# Patient Record
Sex: FEMALE
[De-identification: names, ages, dates, MRNs, and addresses within clinical notes are randomized per-mention and may not be internally consistent; named-entity substitution may affect disease eponyms.]

---

## 2019-09-19 ENCOUNTER — HOSPITAL ENCOUNTER (INPATIENT)
Dept: HOSPITAL 88 - FSED | Age: 59
LOS: 2 days | Discharge: HOME | DRG: 313 | End: 2019-09-21
Attending: INTERNAL MEDICINE | Admitting: INTERNAL MEDICINE
Payer: COMMERCIAL

## 2019-09-19 VITALS — HEIGHT: 63 IN | WEIGHT: 293 LBS | BODY MASS INDEX: 51.91 KG/M2

## 2019-09-19 DIAGNOSIS — J45.909: ICD-10-CM

## 2019-09-19 DIAGNOSIS — I10: ICD-10-CM

## 2019-09-19 DIAGNOSIS — E66.01: ICD-10-CM

## 2019-09-19 DIAGNOSIS — R07.89: Primary | ICD-10-CM

## 2019-09-19 DIAGNOSIS — K21.9: ICD-10-CM

## 2019-09-19 PROCEDURE — 71046 X-RAY EXAM CHEST 2 VIEWS: CPT

## 2019-09-19 PROCEDURE — 93017 CV STRESS TEST TRACING ONLY: CPT

## 2019-09-19 PROCEDURE — 82553 CREATINE MB FRACTION: CPT

## 2019-09-19 PROCEDURE — 80053 COMPREHEN METABOLIC PANEL: CPT

## 2019-09-19 PROCEDURE — 82550 ASSAY OF CK (CPK): CPT

## 2019-09-19 PROCEDURE — 78452 HT MUSCLE IMAGE SPECT MULT: CPT

## 2019-09-19 PROCEDURE — 93306 TTE W/DOPPLER COMPLETE: CPT

## 2019-09-19 PROCEDURE — 80061 LIPID PANEL: CPT

## 2019-09-19 PROCEDURE — 36415 COLL VENOUS BLD VENIPUNCTURE: CPT

## 2019-09-19 PROCEDURE — 99284 EMERGENCY DEPT VISIT MOD MDM: CPT

## 2019-09-19 PROCEDURE — 93005 ELECTROCARDIOGRAM TRACING: CPT

## 2019-09-19 PROCEDURE — 84484 ASSAY OF TROPONIN QUANT: CPT

## 2019-09-19 PROCEDURE — 85025 COMPLETE CBC W/AUTO DIFF WBC: CPT

## 2019-09-19 NOTE — DIAGNOSTIC IMAGING REPORT
EXAMINATION: PA and lateral views of the chest.



COMPARISON: None



CLINICAL HISTORY:  Chest pain since yesterday

     

DISCUSSION:



Lines/tubes:  None.



Lungs:  The lungs are well inflated and clear. There is no evidence of

pneumonia or pulmonary edema.



Pleura:  There is no pleural effusion or pneumothorax.



Heart and mediastinum:  Cardiomediastinal silhouette is unremarkable.   

Pulmonary vasculature is normal.



Bones and soft tissues:  No acute bony abnormalities.  Age appropriate

degenerative changes in the thoracic spine



IMPRESSION: 

No acute cardiopulmonary abnormalities.











Signed by: Dr. Agustín Baugh M.D. on 9/19/2019 9:53 PM

## 2019-09-20 VITALS — DIASTOLIC BLOOD PRESSURE: 60 MMHG | SYSTOLIC BLOOD PRESSURE: 124 MMHG

## 2019-09-20 VITALS — DIASTOLIC BLOOD PRESSURE: 58 MMHG | SYSTOLIC BLOOD PRESSURE: 122 MMHG

## 2019-09-20 VITALS — SYSTOLIC BLOOD PRESSURE: 94 MMHG | DIASTOLIC BLOOD PRESSURE: 51 MMHG

## 2019-09-20 VITALS — SYSTOLIC BLOOD PRESSURE: 117 MMHG | DIASTOLIC BLOOD PRESSURE: 17 MMHG

## 2019-09-20 VITALS — DIASTOLIC BLOOD PRESSURE: 51 MMHG | SYSTOLIC BLOOD PRESSURE: 105 MMHG

## 2019-09-20 VITALS — SYSTOLIC BLOOD PRESSURE: 122 MMHG | DIASTOLIC BLOOD PRESSURE: 58 MMHG

## 2019-09-20 LAB
CK MB SERPL-MCNC: 1.9 NG/ML (ref 0–5)
CK MB SERPL-MCNC: 1.9 NG/ML (ref 0–5)
CK SERPL-CCNC: 86 IU/L (ref 29–168)

## 2019-09-20 RX ADMIN — NITROGLYCERIN SCH GM: 20 OINTMENT TOPICAL at 12:12

## 2019-09-20 RX ADMIN — TOPIRAMATE SCH MG: 25 TABLET, COATED ORAL at 16:00

## 2019-09-20 RX ADMIN — FAMOTIDINE SCH MG: 20 TABLET, FILM COATED ORAL at 08:46

## 2019-09-20 RX ADMIN — Medication SCH MG: at 08:46

## 2019-09-20 RX ADMIN — FAMOTIDINE SCH MG: 20 TABLET, FILM COATED ORAL at 21:35

## 2019-09-20 RX ADMIN — NITROGLYCERIN SCH GM: 20 OINTMENT TOPICAL at 17:52

## 2019-09-20 RX ADMIN — PANTOPRAZOLE SODIUM SCH MG: 40 TABLET, DELAYED RELEASE ORAL at 08:46

## 2019-09-20 RX ADMIN — TOPIRAMATE SCH MG: 25 TABLET, COATED ORAL at 08:46

## 2019-09-20 RX ADMIN — NITROGLYCERIN SCH GM: 20 OINTMENT TOPICAL at 06:00

## 2019-09-20 NOTE — XMS REPORT
[facility]

                             Created on: 2019



Elena Turner

External Reference #: [id number]

: 1960

Sex: Female



Demographics







                          Address                   [address] [place]  [address]

 

                          Home Phone                [phone]

 

                          Preferred Language        Unknown

 

                          Marital Status            Unknown

 

                          Latter day Affiliation     Unknown

 

                          Race                      Unknown

 

                          Ethnic Group              UNK





Author







                          Author                    Tati Garces

 

                          Organization              [employer]

 

                          Address                   Unknown

 

                          Phone                     Unavailable







Care Team Providers







                    Care Team Member Name    Role                Phone

 

                    Tati Garces      CP                  Unavailable



                                                                



Allergies

          No Known Allergies                                                    
                                   



Problems

          





             Problem Type    Condition    Code         Onset Dates    Condition Status

 

             Problem      Other chronic pain    G89.29                    Active

 

             Problem      Pain in right knee    M25.561                   Active

 

             Problem      Pain in left knee    M25.562                   Active

 

             Problem      BMI 50.0-59.9, adult    Z68.43                    Active

 

             Problem      Essential hypertension    I10                       Active

 

             Problem      Cardiomegaly    I51.7                     Active

 

             Problem      Metabolic syndrome    E88.81                    Active

 

             Problem      Gastroesophageal reflux disease without esophagitis    K21.9                     Active

 

                    Problem             Migraine with aura and without status migrainosus, not intractable    G43.109

                                                    Active

 

             Problem      BRODY (obstructive sleep apnea)    G47.33                    Active

 

             Problem      NAFLD (nonalcoholic fatty liver disease)    K76.0                     Active



                                                                                
                                                                                
                          



Medications

          





        Medication    Code System    Code    Instructions    Start Date    End Date    Status    Dosage



 

        Pantoprazole Sodium    NDC     [address]-24    40 mg Orally Once a day                    Active    

1 tablet



                                                                              



Results

          No Known Results                                                      
             



Summary Purpose

          [employer] Submission

## 2019-09-20 NOTE — XMS REPORT
[facility]patient [facility]

                             Created on: 2018



TurnerElena caal

External Reference #: [id number]

: 1960

Sex: Female



Demographics







                          Address                   [address] [place]  [address]

 

                          Home Phone                [phone]

 

                          Preferred Language        Unknown

 

                          Marital Status            Unknown

 

                          Yazidism Affiliation     Unknown

 

                          Race                      Unknown

 

                          Ethnic Group              /Latin





Author







                          Author                    Tati Garces

 

                          Organization              [employer]

 

                          Address                   Unknown

 

                          Phone                     Unavailable







Care Team Providers







                    Care Team Member Name    Role                Phone

 

                    Tati Garces      CP                  Unavailable



                                                                



Allergies

          No Known Allergies                                                    
                                   



Problems

          





             Problem Type    Condition    Code         Onset Dates    Condition Status

 

             Assessment    Gastroesophageal reflux disease without esophagitis    K21.9                     Active



 

             Problem      BMI 50.0-59.9, adult    Z68.43                    Active

 

             Problem      Other chronic pain    G89.29                    Active

 

             Problem      BRODY (obstructive sleep apnea)    G47.33                    Active

 

             Problem      NAFLD (nonalcoholic fatty liver disease)    K76.0                     Active

 

             Problem      Cardiomegaly    I51.7                     Active

 

             Problem      Pain in right knee    M25.561                   Active

 

             Problem      Pain in left knee    M25.562                   Active

 

             Problem      Gastroesophageal reflux disease without esophagitis    K21.9                     Active

 

                    Problem             Migraine with aura and without status migrainosus, not intractable    G43.109

                                                    Active



                                                                                
                                                                                
                



Medications

          





        Medication    Code System    Code    Instructions    Start Date    End Date    Status    Dosage



 

           Pantoprazole Sodium    NDC        69644641946    40 mg Orally Once a day    2017      

                          Active                    1 tablet



                                                                              



Results

          No Known Results                                                      
             



Summary Purpose

          [employer] Submission

## 2019-09-20 NOTE — XMS REPORT
Inpatient Providers [facility]

                             Created on: 2019



TurnerElena caal

External Reference #: [id number]

: 1960

Sex: Female



Demographics







                          Address                   [address] [place]  [address]

 

                          Home Phone                [phone]

 

                          Preferred Language        Unknown

 

                          Marital Status            Unknown

 

                          Mandaeism Affiliation     Unknown

 

                          Race                      Unknown

 

                          Ethnic Group              UNK





Author







                          Author                    Tati Garces

 

                          Organization              [employer]

 

                          Address                   Unknown

 

                          Phone                     Unavailable







Care Team Providers







                    Care Team Member Name    Role                Phone

 

                    Tati Garces      CP                  Unavailable



                                                                



Allergies

          No Known Allergies                                                    
                                   



Problems

          





             Problem Type    Condition    Code         Onset Dates    Condition Status

 

             Problem      Other chronic pain    G89.29                    Active

 

             Problem      Pain in right knee    M25.561                   Active

 

             Problem      Pain in left knee    M25.562                   Active

 

             Assessment    Left-sided chest pain    R07.9                     Active

 

             Problem      BMI 50.0-59.9, adult    Z68.43                    Active

 

             Problem      Essential hypertension    I10                       Active

 

             Problem      Cardiomegaly    I51.7                     Active

 

             Problem      Metabolic syndrome    E88.81                    Active

 

             Problem      Gastroesophageal reflux disease without esophagitis    K21.9                     Active

 

                    Problem             Migraine with aura and without status migrainosus, not intractable    G43.109

                                                    Active

 

             Problem      BRODY (obstructive sleep apnea)    G47.33                    Active

 

             Problem      NAFLD (nonalcoholic fatty liver disease)    K76.0                     Active



                                                                                
                                                                                
                                    



Medications

          No Known Medications                                                  
                           



Results

          No Known Results                                                      
             



Summary Purpose

          [employer] Submission

## 2019-09-20 NOTE — XMS REPORT
Patient Summary Document

                             Created on: 2019



TAYLOR HENDERSON

External Reference #: [id number]

: 1960

Sex: Female



Demographics







                          Address                   [address] [place] [address]  [address]

 

                          Home Phone                [phone]

 

                          Preferred Language        Unknown

 

                          Marital Status            Unknown

 

                          Rastafarian Affiliation     Unknown

 

                          Race                      Unknown

 

                                        Additional Race(s)  

 

                          Ethnic Group              Unknown





Author







                          Author                    [facility]

 

                          [facility]

 

                          Address                   Unknown

 

                          Phone                     Unavailable







Care Team Providers







                    Care Team Member Name    Role                Phone

 

                    SUSANA TERESA       Unavailable         Unavailable







Problems

This patient has no known problems.



Allergies, Adverse Reactions, Alerts

This patient has no known allergies or adverse reactions.



Medications

This patient has no known medications.



Results







           Test Description    Test Time    Test Comments    Text Results    Atomic Results    Result

 Comments

 

                [facility]    2019 21:52:00                                                         

                                                 [place] [address]      Patient Name: TAYLOR HENDERSON                                  
MR #: [id number]                     : 1960                            
      Age/Sex: 59/F  Acct #: [id number]                              Req #: 
[id number]  Adm Physician:                                                      
Ordered by: KUNAL TERESA MD                            Report #: [id number]     
  Location: [facility]                                    Room/Bed:                   
 
___________________________________________________________________________________________________
   Procedure: [id number] HOPD/CXR 2 VIEW - [facility]  Exam Date: 19             
              Exam Time:                                               
REPORT STATUS: Signed       EXAMINATION: PA and lateral views of the chest.     
COMPARISON: None      CLINICAL HISTORY:  Chest pain since yesterday           
DISCUSSION:      Lines/tubes:  None.      Lungs:  The lungs are well inflated 
and clear. There is no evidence of   pneumonia or pulmonary edema.      Pleura: 
There is no pleural effusion or pneumothorax.      Heart and mediastinum:  
Cardiomediastinal silhouette is unremarkable.      Pulmonary vasculature is 
normal.      Bones and soft tissues:  No acute bony abnormalities.  Age 
appropriate   degenerative changes in the thoracic spine      IMPRESSION:    No 
acute cardiopulmonary abnormalities.                  Signed by: Dr. Waqas Baugh M.D. on 2019 9:53 PM        Dictated By: WAQAS BAUGH MD  
Electronically Signed By: WAQAS BAUGH MD on 19  Transcribed By: 
BEV on 19       COPY TO:   KUNAL TERESA MD              

 

                SCR MAMM BILATERAL [facility] CAD DIGITAL    2019 13:44:25                     - SCR MAMM BILATERAL

 [facility] CAD DIGITALBILATERAL DIGITAL SCREENING MAMMOGRAM 3D/2D WITH CAD: 
2019CLINICAL: Asymptomatic.  Digital breast tomosynthesis was performed in 
addition to routine CC and MLO views.  Current mammographic images were 
evaluated by either a [employer] M-Vu or a [employer] ImageChecker CAD (computer aided 
detection system).  Comparison is made to exam dated  2017 mammogram - [facility].  There are scattered fibroglandular tissues in both 
breasts.  There are benign calcifications in both breasts.  No suspicious mass, 
architectural distortion, malignant type calcification, or lymph node 
abnormality detected.  Breast architecture is stable compared to prior 
exams.IMPRESSION: BENIGNThere is no mammographic evidence of malignancy. Resume 
annual screening mammography in one year.  Eric Mane M.D.          
ss/penrad:2019 13:44:25  Imaging Technologist: Syl Louie [facility], [facility] sent: BIRADS 1-2 Normal  Mammogram BI-RADS: 2 
Benign

## 2019-09-20 NOTE — XMS REPORT
Inpatient [facility]

                             Created on: 2019



Elena Turner

External Reference #: [id number]

: 1960

Sex: Female



Demographics







                          Address                   [address] [place]  [address]

 

                          Home Phone                [phone]

 

                          Preferred Language        Unknown

 

                          Marital Status            Unknown

 

                          Christianity Affiliation     Unknown

 

                          Race                      Unknown

 

                          Ethnic Group              UNK





Author







                          Author                    Tati Garces

 

                          Organization              [employer]

 

                          Address                   Unknown

 

                          Phone                     Unavailable







Care Team Providers







                    Care Team Member Name    Role                Phone

 

                    Tati Garces                        Unavailable



                                                                



Allergies, Adverse Reactions, Alerts

          





                    Substance           Reaction            Event Type

 

                    N.K.D.A.            Info Not Available    Non Drug Allergy



                                                                                
       



Problems

          





             Problem Type    Condition    Code         Onset Dates    Condition Status

 

             Problem      Other chronic pain    G89.29                    Active

 

             Problem      Pain in right knee    M25.561                   Active

 

             Problem      Pain in left knee    M25.562                   Active

 

             Problem      Essential hypertension    I10                       Active

 

             Problem      Cardiomegaly    I51.7                     Active

 

             Problem      Metabolic syndrome    E88.81                    Active

 

             Problem      Gastroesophageal reflux disease without esophagitis    K21.9                     Active

 

                    Problem             Migraine with aura and without status migrainosus, not intractable    G43.109

                                                    Active

 

             Problem      BRODY (obstructive sleep apnea)    G47.33                    Active

 

             Problem      NAFLD (nonalcoholic fatty liver disease)    K76.0                     Active

 

                    Assessment          Migraine with aura and without status migrainosus, not intractable    

G43.109                                             Active

 

             Assessment    Essential hypertension    I10                       Active

 

             Assessment    Left-sided chest pain    R07.9                     Active

 

             Assessment    BRODY (obstructive sleep apnea)    G47.33                    Active

 

             Assessment    Metabolic syndrome    E88.81                    Active

 

             Problem      BMI 50.0-59.9, adult    Z68.43                    Active



                                                                                
                                                                                
                                                                            



Medications

          





        Medication    Code System    Code    Instructions    Start Date    End Date    Status    Dosage



 

        Topamax    NDC     83023634504    100 MG Orally bid    2019            Active    bid

 

          Telmisartan    NDC       53431019914    20 mg Orally Once a day    2019              Active

                                        1 tablet

 

             Olmesartan Medoxomil    NDC          12098294976    20 MG Orally Once a day    2019 

                                        Inactive            1 tablet

 

          Telmisartan    NDC       76500953294    20 MG Orally Once a day    2019              Active

                                        1 tablets

 

           ProAir HFA    NDC        30829874573    108 (90 Base) MCG/ACT Inhalation every 6 hrs                

                          Active                    2 puffs as needed

 

           Phentermine HCl    NDC        64261738108    37.5 MG Orally Once a day                     Active                    1 tablet

 

        Topamax    NDC     83693633403    25 MG Orally daily    Aug 31, 2018            Inactive    1 tablet



 

        Pantoprazole Sodium    NDC     [address]-19    40 mg Orally Once a day                    Active    

1 tablet



                                                                                
                                                                                
      



Vital Signs

          





                          Date/Time:                2019

 

                          BMI                       58.27 Index

 

                          Weight                    329 lbs

 

                          Height                    63" in

 

                          Temperature               97.8 F

 

                          Blood Pressure Diastolic    70 mm Hg

 

                          Blood Pressure Systolic    126 mm Hg



                                                                              



Results

          No Known Results                                                      
             



Summary Purpose

          eClinicalWorks Submission

## 2019-09-20 NOTE — XMS REPORT
[facility]

                             Created on: 10/05/2018



Elena Turner

External Reference #: [id number]

: 1960

Sex: Female



Demographics







                          Address                   [address] [place]  [address]

 

                          Home Phone                [phone]

 

                          Preferred Language        Unknown

 

                          Marital Status            Unknown

 

                          Latter-day Affiliation     Unknown

 

                          Race                      Unknown

 

                          Ethnic Group              /Latin





Author







                          Author                    Tati Garces

 

                          Organization              [employer]

 

                          Address                   Unknown

 

                          Phone                     Unavailable







Care Team Providers







                    Care Team Member Name    Role                Phone

 

                    Tati Garces      CP                  Unavailable



                                                                



Allergies, Adverse Reactions, Alerts

          





                    Substance           Reaction            Event Type

 

                    N.K.D.A.            Info Not Available    Non Drug Allergy



                                                                                
       



Problems

          





             Problem Type    Condition    Code         Onset Dates    Condition Status

 

             Problem      Other chronic pain    G89.29                    Active

 

             Problem      Pain in left knee    M25.562                   Active

 

             Problem      BMI 50.0-59.9, adult    Z68.43                    Active

 

             Problem      Cardiomegaly    I51.7                     Active

 

             Problem      BRODY (obstructive sleep apnea)    G47.33                    Active

 

             Problem      Essential hypertension    I10                       Active

 

                    Problem             Migraine with aura and without status migrainosus, not intractable    G43.109

                                                    Active

 

             Problem      Pain in right knee    M25.561                   Active

 

             Problem      NAFLD (nonalcoholic fatty liver disease)    K76.0                     Active

 

             Problem      Gastroesophageal reflux disease without esophagitis    K21.9                     Active

 

             Assessment    Essential hypertension    I10                       Active

 

             Assessment    Tender lymph node    R59.1                     Active

 

             Assessment    Acute non-recurrent streptococcal tonsillitis    J03.00                    Active



                                                                                
                                                                                
                                              



Medications

          





        Medication    Code System    Code    Instructions    Start Date    End Date    Status    Dosage



 

           Augmentin    NDC        22291026348    875-125 MG Orally every 12 hrs    Oct 01, 2018    Oct 08,

 2018                     Active                    1 tablet

 

        Topamax    NDC     83275699705    25 MG Orally daily    Aug 31, 2018            Active    1 tablet



 

        Dulera    NDC     17663896982    200-5 MCG/ACT Inhalation Twice a day                    Active    2 

puffs

 

          Losartan Potassium    NDC       74013048702    50 mg Orally Once a day    Oct 01, 2018              

Active                                  1 tablet

 

        Pantoprazole Sodium    NDC     [address]-67    40 mg Orally Once a day                    Active    

1 tablet



                                                                                
                                                         



Vital Signs

          





                          Date/Time:                Oct 01, 2018

 

                          BMI                       56.86 Index

 

                          Weight                    321 lbs

 

                          Height                    63" in

 

                          Temperature               97.4 F

 

                          Blood Pressure Diastolic    85 mm Hg

 

                          Blood Pressure Systolic    158 mm Hg



                                                                              



Results

          No Known Results                                                      
             



Summary Purpose

          [employer] Submission

## 2019-09-20 NOTE — XMS REPORT
[facility]

                             Created on: 2018



Elena Turner

External Reference #: [id number]

: 1960

Sex: Female



Demographics







                          Address                   [address] [place]  [address]

 

                          Home Phone                [phone]

 

                          Preferred Language        Unknown

 

                          Marital Status            Unknown

 

                          Evangelical Affiliation     Unknown

 

                          Race                      Unknown

 

                          Ethnic Group              /Latin





Author







                          Author                    Tati Garces

 

                          Organization              [employer]

 

                          Address                   Unknown

 

                          Phone                     Unavailable







Care Team Providers







                    Care Team Member Name    Role                Phone

 

                    Tati Garces      CP                  Unavailable



                                                                



Allergies, Adverse Reactions, Alerts

          





                    Substance           Reaction            Event Type

 

                    N.K.D.A.            Info Not Available    Non Drug Allergy



                                                                                
       



Problems

          





             Problem Type    Condition    Code         Onset Dates    Condition Status

 

             Assessment    Cardiomegaly    I51.7                     Active

 

             Problem      BMI 50.0-59.9, adult    Z68.43                    Active

 

             Problem      Other chronic pain    G89.29                    Active

 

             Assessment    BMI 50.0-59.9, adult    Z68.43                    Active

 

             Assessment    BRODY (obstructive sleep apnea)    G47.33                    Active

 

             Problem      BRODY (obstructive sleep apnea)    G47.33                    Active

 

             Problem      NAFLD (nonalcoholic fatty liver disease)    K76.0                     Active

 

             Problem      Cardiomegaly    I51.7                     Active

 

             Problem      Pain in right knee    M25.561                   Active

 

             Problem      Pain in left knee    M25.562                   Active

 

             Problem      Gastroesophageal reflux disease without esophagitis    K21.9                     Active

 

                    Problem             Migraine with aura and without status migrainosus, not intractable    G43.109

                                                    Active



                                                                                
                                                                                
                                    



Medications

          





        Medication    Code System    Code    Instructions    Start Date    End Date    Status    Dosage



 

        Ibuprofen    NDC     61677205228    200 MG Orally Once a day                    Active    4 tablet with

 food or milk as needed

 

                Albuterol Sulfate HFA    NDC             [address]-56    108 (90 Base) MCG/ACT Inhalation every

 4 hrs                                          Active          2 puffs as needed

 

        Clobeta Cream    NDC     0                               Active    not defined

 

           Pantoprazole Sodium    NDC        02637632141    40 mg Orally Once a day    2017      

                          Active                    1 tablet



                                                                                
                                               



Vital Signs

          





                          Date/Time:                2018

 

                          BMI                       58.10 Index

 

                          Weight                    328 lbs

 

                          Height                    63" in

 

                          Temperature               98.1 F

 

                          Blood Pressure Diastolic    78 mm Hg

 

                          Blood Pressure Systolic    132 mm Hg



                                                                              



Results

          No Known Results                                                      
             



Summary Purpose

          [employer] Submission

## 2019-09-20 NOTE — XMS REPORT
[facility]

                             Created on: 2018



TurnerElena caal

External Reference #: [id number]

: 1960

Sex: Female



Demographics







                          Address                   [address] [place]  [address]

 

                          Home Phone                [phone]

 

                          Preferred Language        Unknown

 

                          Marital Status            Unknown

 

                          Spiritism Affiliation     Unknown

 

                          Race                      Unknown

 

                          Ethnic Group              /Latin





Author







                          Author                    Tati Garces

 

                          Organization              [employer]

 

                          Address                   Unknown

 

                          Phone                     Unavailable







Care Team Providers







                    Care Team Member Name    Role                Phone

 

                    Tati Garces      CP                  Unavailable



                                                                



Allergies, Adverse Reactions, Alerts

          





                    Substance           Reaction            Event Type

 

                    N.K.D.A.            Info Not Available    Non Drug Allergy



                                                                                
       



Problems

          





             Problem Type    Condition    Code         Onset Dates    Condition Status

 

             Assessment    Atypical pneumonia    J18.9                     Active

 

             Problem      BMI 50.0-59.9, adult    Z68.43                    Active

 

             Problem      Other chronic pain    G89.29                    Active

 

             Problem      BRODY (obstructive sleep apnea)    G47.33                    Active

 

             Problem      NAFLD (nonalcoholic fatty liver disease)    K76.0                     Active

 

             Problem      Cardiomegaly    I51.7                     Active

 

             Problem      Pain in right knee    M25.561                   Active

 

             Problem      Pain in left knee    M25.562                   Active

 

             Problem      Gastroesophageal reflux disease without esophagitis    K21.9                     Active

 

                    Problem             Migraine with aura and without status migrainosus, not intractable    G43.109

                                                    Active

 

             Assessment    NAFLD (nonalcoholic fatty liver disease)    K76.0                     Active

 

             Assessment    BRODY (obstructive sleep apnea)    G47.33                    Active

 

             Assessment    Cardiomegaly    I51.7                     Active

 

             Assessment    Acute tracheobronchitis    J20.9                     Active



                                                                                
                                                                                
                                                        



Medications

          





        Medication    Code System    Code    Instructions    Start Date    End Date    Status    Dosage



 

           Celebrex    NDC        58598542147    100 mg Orally Once a day    

                          Active                    1 capsule with food

 

                Albuterol Sulfate HFA    NDC             [address]-11    108 (90 Base) MCG/ACT Inhalation every

 4 hrs                                          Active          2 puffs as needed

 

        Clobeta Cream    NDC     0                               Active    not defined

 

          MethylPREDNISolone    NDC       77772546065    4 MG Orally As directed    2018              

Active                                  as directed

 

           Pantoprazole Sodium    NDC        67630457608    40 mg Orally Once a day    2017      

                          Active                    1 tablet

 

        Nexium    NDC     43746195298    40 MG Orally Once a day                    Active    1 capsule

 

           Levofloxacin    NDC        87778734476    750 MG Orally Once a day for 5 days               2018                      Active                    1 tablet

 

        Ibuprofen    NDC     87474730458    200 MG Orally Once a day                    Active    4 tablet with

 food or milk as needed



                                                                                
                                                                                
      



Vital Signs

          





                          Date/Time:                2018

 

                          BMI                       57.56 Index

 

                          Weight                    325 lbs

 

                          Height                    63" in

 

                          Temperature               96.9 F

 

                          Blood Pressure Diastolic    83 mm Hg

 

                          Blood Pressure Systolic    130 mm Hg



                                                                              



Results

          No Known Results                                                      
             



Summary Purpose

          [employer] Submission

## 2019-09-20 NOTE — NUR
PATIENT IS AWAKE AND IN STABLE CONDITION WITH NO S/S OF RESPIRATORY DISTRESS. NO PAIN 
VOICED.  TELEMETRY APPLIED. FAMILY MEMBERS PRESENT IN ROOM. CALL LIGHT IS WITHIN REACH, 
PATIENT INSTRUCTED TO CALL FOR ASSISTANCE AS NEEDED. REPORT GIVEN TO ONCOMING NURSE.

## 2019-09-20 NOTE — XMS REPORT
[facility]

                             Created on: 2018



TurnerElena caal

External Reference #: [id number]

: 1960

Sex: Female



Demographics







                          Address                   [address] [place]  [address]

 

                          Home Phone                [phone]

 

                          Preferred Language        Unknown

 

                          Marital Status            Unknown

 

                          Hoahaoism Affiliation     Unknown

 

                          Race                      Unknown

 

                          Ethnic Group              /Latin





Author







                          Author                    Tati Garces

 

                          Organization              [employer]

 

                          Address                   Unknown

 

                          Phone                     Unavailable







Care Team Providers







                    Care Team Member Name    Role                Phone

 

                    Tati Garces      CP                  Unavailable



                                                                



Allergies

          No Known Allergies                                                    
                                   



Problems

          





             Problem Type    Condition    Code         Onset Dates    Condition Status

 

             Problem      BMI 50.0-59.9, adult    Z68.43                    Active

 

             Problem      Other chronic pain    G89.29                    Active

 

             Problem      BRODY (obstructive sleep apnea)    G47.33                    Active

 

             Problem      NAFLD (nonalcoholic fatty liver disease)    K76.0                     Active

 

             Problem      Cardiomegaly    I51.7                     Active

 

             Problem      Pain in right knee    M25.561                   Active

 

             Problem      Pain in left knee    M25.562                   Active

 

             Problem      Gastroesophageal reflux disease without esophagitis    K21.9                     Active

 

                    Problem             Migraine with aura and without status migrainosus, not intractable    G43.109

                                                    Active



                                                                                
                                                                                
      



Medications

          





        Medication    Code System    Code    Instructions    Start Date    End Date    Status    Dosage



 

        Pantoprazole Sodium    NDC     [address]-40    40 mg Orally Once a day                    Active    

1 tablet



                                                                              



Results

          No Known Results                                                      
             



Summary Purpose

          [employer] Submission

## 2019-09-20 NOTE — XMS REPORT
[facility]

                             Created on: 2018



Elena Turner

External Reference #: [id number]

: 1960

Sex: Female



Demographics







                          Address                   [address] [place]  [address]

 

                          Home Phone                [phone]

 

                          Preferred Language        Unknown

 

                          Marital Status            Unknown

 

                          Gnosticist Affiliation     Unknown

 

                          Race                      Unknown

 

                          Ethnic Group              /Latin





Author







                          Author                    Tati Garces

 

                          Organization              [employer]

 

                          Address                   Unknown

 

                          Phone                     Unavailable







Care Team Providers







                    Care Team Member Name    Role                Phone

 

                    Tati Garces      CP                  Unavailable



                                                                



Allergies, Adverse Reactions, Alerts

          





                    Substance           Reaction            Event Type

 

                    N.K.D.A.            Info Not Available    Non Drug Allergy



                                                                                
       



Problems

          





             Problem Type    Condition    Code         Onset Dates    Condition Status

 

             Assessment    Daily headache    R51                       Active

 

             Problem      BMI 50.0-59.9, adult    Z68.43                    Active

 

             Problem      Other chronic pain    G89.29                    Active

 

             Assessment    Pain in left knee    M25.562                   Active

 

             Assessment    Pain in right knee    M25.561                   Active

 

             Problem      BRODY (obstructive sleep apnea)    G47.33                    Active

 

             Problem      NAFLD (nonalcoholic fatty liver disease)    K76.0                     Active

 

             Problem      Cardiomegaly    I51.7                     Active

 

             Problem      Pain in right knee    M25.561                   Active

 

             Problem      Pain in left knee    M25.562                   Active

 

             Problem      Gastroesophageal reflux disease without esophagitis    K21.9                     Active

 

                    Problem             Migraine with aura and without status migrainosus, not intractable    G43.109

                                                    Active



                                                                                
                                                                                
                                    



Medications

          





        Medication    Code System    Code    Instructions    Start Date    End Date    Status    Dosage



 

        Dulera    NDC     03037210265    200-5 MCG/ACT Inhalation Twice a day                    Active    2 

puffs

 

        Pantoprazole Sodium    NDC     74831123626    40 mg Orally Once a day                    Active    1 

tablet

 

        Medrol    NDC     46009901636    4 MG Orally as directed    2018            Active    as

 directed

 

                Albuterol Sulfate HFA    NDC             [address]-45    108 (90 Base) MCG/ACT Inhalation every

 4 hrs                                          Active          2 puffs as needed

 

        Clobeta Cream    NDC     0                               Active    not defined

 

           Diclofenac Sodium    NDC        97927376953    1 % Transdermal bid prn    2018    Oct

 02, 2018                 Active                    as directed

 

        Topamax    NDC     10951245183    25 MG Orally daily    Aug 31, 2018            Active    1 tablet



 

        Ibuprofen    NDC     71794750278    200 MG Orally Once a day                    Active    4 tablet with

 food or milk as needed



                                                                                
                                                                                
      



Vital Signs

          





                          Date/Time:                Aug 31, 2018

 

                          BMI                       60.22 Index

 

                          Weight                    340 lbs

 

                          Height                    63" in

 

                          Temperature               98.3 F

 

                          Blood Pressure Diastolic    82 mm Hg

 

                          Blood Pressure Systolic    152 mm Hg



                                                                              



Results

          No Known Results                                                      
             



Summary Purpose

          [employer] Submission

## 2019-09-20 NOTE — OPERATIVE REPORT
DATE OF PROCEDURE:  09/20/2019

 

SURGEON:  Serg Simmons MD

 

INDICATION:  Angina.

 

TECHNIQUE:  The patient was stressed using 1 minute intravenous infusion of Lexiscan.

Rest and stress Myoview imaging was obtained. 

 

Resting heart rate 82 beats and resting blood pressure 95/63.  Following Lexiscan

infusion, no EKG changes, no chest pain.  Both nuclear imaging and resting stress is

normal.  Normal contractility of the left ventricle. 

 

CONCLUSIONS:  

1. Normal Lexiscan nuclear stress test without evidence of ischemia or infarction.

2. LV ejection fraction of 57%. 

 

The accession number for this study is [id number].

 

 

 

______________________________

Serg Simmons MD

 

[facility]

D:  09/20/2019 16:36:55

T:  09/20/2019 23:12:02

Job #:  [id number]

## 2019-09-20 NOTE — XMS REPORT
[facility]

                             Created on: 2017



Elena Turner

External Reference #: [id number]

: 1960

Sex: Female



Demographics







                          Address                   [address] [place]  [address]

 

                          Home Phone                [phone]

 

                          Preferred Language        Unknown

 

                          Marital Status            Unknown

 

                          Cheondoism Affiliation     Unknown

 

                          Race                      Unknown

 

                          Ethnic Group              /Latin





Author







                          Author                    Tati Garces

 

                          Organization              [employer]

 

                          Address                   Unknown

 

                          Phone                     Unavailable







Care Team Providers







                    Care Team Member Name    Role                Phone

 

                    Tati Garces      CP                  Unavailable



                                                                



Allergies

          No Known Allergies                                                    
                                   



Problems

          





             Problem Type    Condition    Code         Onset Dates    Condition Status

 

             Problem      Pain in right knee    M25.561                   Active

 

             Problem      Pain in left knee    M25.562                   Active

 

             Problem      Other chronic pain    G89.29                    Active

 

             Problem      BMI 50.0-59.9, adult    Z68.43                    Active

 

                    Problem             Migraine with aura and without status migrainosus, not intractable    G43.109

                                                    Active



                                                                                
                                               



Medications

          





        Medication    Code System    Code    Instructions    Start Date    End Date    Status    Dosage



 

        Nexium    NDC     73000527792    40 MG Orally Once a day                    Active    1 capsule



                                                                              



Results

          No Known Results                                                      
             



Summary Purpose

          [employer] Submission

## 2019-09-20 NOTE — XMS REPORT
[facility]

                             Created on: 2019



TurnerElena caal

External Reference #: [id number]

: 1960

Sex: Female



Demographics







                          Address                   [address] [place]  [address]

 

                          Home Phone                [phone]

 

                          Preferred Language        Unknown

 

                          Marital Status            Unknown

 

                          Church Affiliation     Unknown

 

                          Race                      Unknown

 

                          Ethnic Group              UNK





Author







                          Author                    Tati Garces

 

                          Organization              [employer]

 

                          Address                   Unknown

 

                          Phone                     Unavailable







Care Team Providers







                    Care Team Member Name    Role                Phone

 

                    Tati Garces      CP                  Unavailable



                                                                



Allergies

          No Known Allergies                                                    
                                   



Problems

          





             Problem Type    Condition    Code         Onset Dates    Condition Status

 

             Problem      Other chronic pain    G89.29                    Active

 

             Problem      Pain in left knee    M25.562                   Active

 

             Problem      BMI 50.0-59.9, adult    Z68.43                    Active

 

             Problem      Cardiomegaly    I51.7                     Active

 

             Problem      BRODY (obstructive sleep apnea)    G47.33                    Active

 

             Problem      Essential hypertension    I10                       Active

 

                    Problem             Migraine with aura and without status migrainosus, not intractable    G43.109

                                                    Active

 

             Problem      Pain in right knee    M25.561                   Active

 

             Problem      NAFLD (nonalcoholic fatty liver disease)    K76.0                     Active

 

             Problem      Gastroesophageal reflux disease without esophagitis    K21.9                     Active



                                                                                
                                                                                
                



Medications

          





        Medication    Code System    Code    Instructions    Start Date    End Date    Status    Dosage



 

        Pantoprazole Sodium    NDC     [address]-15    40 mg Orally Once a day                    Active    

1 tablet



                                                                              



Results

          No Known Results                                                      
             



Summary Purpose

          [employer] Submission

## 2019-09-20 NOTE — XMS REPORT
[facility]

                             Created on: 04/10/2018



Elena Turner

External Reference #: [id number]

: 1960

Sex: Female



Demographics







                          Address                   [address] [place]  [address]

 

                          Home Phone                [phone]

 

                          Preferred Language        Unknown

 

                          Marital Status            Unknown

 

                          Yazidism Affiliation     Unknown

 

                          Race                      Unknown

 

                          Ethnic Group              /Latin





Author







                          Author                    Tati Garces

 

                          Organization              [employer]

 

                          Address                   Unknown

 

                          Phone                     Unavailable







Care Team Providers







                    Care Team Member Name    Role                Phone

 

                    Tati Garces      CP                  Unavailable



                                                                



Allergies, Adverse Reactions, Alerts

          





                    Substance           Reaction            Event Type

 

                    N.K.D.A.            Info Not Available    Non Drug Allergy



                                                                                
       



Problems

          





             Problem Type    Condition    Code         Onset Dates    Condition Status

 

             Assessment    Pain in right knee    M25.561                   Active

 

             Problem      BMI 50.0-59.9, adult    Z68.43                    Active

 

             Problem      Other chronic pain    G89.29                    Active

 

             Problem      BRODY (obstructive sleep apnea)    G47.33                    Active

 

             Problem      NAFLD (nonalcoholic fatty liver disease)    K76.0                     Active

 

             Problem      Cardiomegaly    I51.7                     Active

 

             Problem      Pain in right knee    M25.561                   Active

 

             Problem      Pain in left knee    M25.562                   Active

 

             Problem      Gastroesophageal reflux disease without esophagitis    K21.9                     Active

 

                    Problem             Migraine with aura and without status migrainosus, not intractable    G43.109

                                                    Active

 

             Assessment    Gastroesophageal reflux disease without esophagitis    K21.9                     Active



 

             Assessment    Right shoulder tendonitis    M75.81                    Active

 

             Assessment    BMI 50.0-59.9, adult    Z68.43                    Active

 

             Assessment    Other chronic pain    G89.29                    Active

 

             Assessment    BRODY (obstructive sleep apnea)    G47.33                    Active

 

             Assessment    Pain in left knee    M25.562                   Active



                                                                                
                                                                                
                                                                            



Medications

          





        Medication    Code System    Code    Instructions    Start Date    End Date    Status    Dosage



 

        Dulera    NDC     55560423209    200-5 MCG/ACT Inhalation Twice a day                    Active    2 

puffs

 

           Pantoprazole Sodium    NDC        05431644341    40 mg Orally Once a day    2017      

                          Active                    1 tablet

 

           Diclofenac Sodium    NDC        40791545796    1 % Transdermal bid prn    2018    Oct

 02, 2018                 Active                    as directed

 

        Clobeta Cream    NDC     0                               Active    not defined

 

                Albuterol Sulfate HFA    NDC             [address]-99    108 (90 Base) MCG/ACT Inhalation every

 4 hrs                                          Active          2 puffs as needed

 

        Ibuprofen    NDC     03652089001    200 MG Orally Once a day                    Active    4 tablet with

 food or milk as needed

 

        Medrol    NDC     44524305757    4 MG Orally as directed    2018            Active    as

 directed



                                                                                
                                                                             



Vital Signs

          





                          Date/Time:                2018

 

                          BMI                       58.45 Index

 

                          Weight                    330 lbs

 

                          Height                    63" in

 

                          Temperature               98.5 F

 

                          Blood Pressure Diastolic    79 mm Hg

 

                          Blood Pressure Systolic    131 mm Hg



                                                                              



Results

          No Known Results                                                      
             



Summary Purpose

          [employer] Submission

## 2019-09-20 NOTE — XMS REPORT
[facility]

                             Created on: 2017



Elena Turner

External Reference #: [id number]

: 1960

Sex: Female



Demographics







                          Address                   [address] [place]  [address]

 

                          Home Phone                [phone]

 

                          Preferred Language        Unknown

 

                          Marital Status            Unknown

 

                          Mormonism Affiliation     Unknown

 

                          Race                      Unknown

 

                          Ethnic Group              /Latin





Author







                          Author                    Tati Garces

 

                          Organization              [employer]

 

                          Address                   Unknown

 

                          Phone                     Unavailable







Care Team Providers







                    Care Team Member Name    Role                Phone

 

                    Tati Garces      CP                  Unavailable



                                                                



Allergies, Adverse Reactions, Alerts

          





                    Substance           Reaction            Event Type

 

                    N.K.D.A.            Info Not Available    Non Drug Allergy



                                                                                
       



Problems

          





             Problem Type    Condition    Code         Onset Dates    Condition Status

 

             Assessment    Colon cancer screening    Z12.11                    Active

 

             Assessment    Pain in left knee    M25.562                   Active

 

             Assessment    Other chronic pain    G89.29                    Active

 

             Problem      Pain in right knee    M25.561                   Active

 

             Problem      Pain in left knee    M25.562                   Active

 

             Problem      Other chronic pain    G89.29                    Active

 

             Assessment    Encounter to establish care    Z76.89                    Active

 

             Assessment    Pain in right knee    M25.561                   Active

 

             Problem      BMI 50.0-59.9, adult    Z68.43                    Active

 

                    Problem             Migraine with aura and without status migrainosus, not intractable    G43.109

                                                    Active

 

             Assessment    BMI 50.0-59.9, adult    Z68.43                    Active

 

                    Assessment          Migraine with aura and without status migrainosus, not intractable    

G43.109                                             Active

 

             Assessment    Cervical cancer screening    Z12.4                     Active

 

             Assessment    Breast cancer screening    Z12.31                    Active



                                                                                
                                                                                
                                                        



Medications

          





        Medication    Code System    Code    Instructions    Start Date    End Date    Status    Dosage



 

        Ibuprofen    NDC     [id number]    200 MG Orally Once a day                    Active    4 tablet with

 food or milk as needed

 

        Clobeta Cream    NDC     0                               Active    not defined

 

                Tylenol/Codeine #3    NDC             44338317840     300-30 MG Orally once every night as needed

 for pain       2017    Dec 13, 2017    Active          1 tablet as needed

 

        Nexium    NDC     97948154172    40 MG Orally Once a day                    Active    1 capsule



                                                                                
                                               



Vital Signs

          





                          Date/Time:                2017

 

                          BMI                       59.34 Index

 

                          Weight                    335 lbs

 

                          Height                    63" in

 

                          Temperature               96.6 F

 

                          Blood Pressure Diastolic    88 mm Hg

 

                          Blood Pressure Systolic    153 mm Hg



                                                                              



Results

          No Known Results                                                      
             



Summary Purpose

          [employer] Submission

## 2019-09-20 NOTE — XMS REPORT
Continuity of Care Document

                             Created on: 2019



TAYLOR HENDERSON

External Reference #: [id number]

: 1960

Sex: Female



Demographics







                          Address                   [address] [place]  [address]

 

                          Home Phone                [phone]

 

                          Preferred Language        English

 

                          Marital Status            Unknown

 

                          Taoism Affiliation     Unknown

 

                          Race                      Unknown

 

                          Ethnic Group              Unknown





Author







                          Author                    [facility]

 

                          Organization              [facility]

 

                          Address                   Unknown

 

                          Phone                     Unavailable







Care Team Providers







                    Care Team Member Name    Role                Phone

 

                    [employer]    Unavailable         Unavailable



                                    



Problems

                    





                    Problem                            Status                            Onset Date     

                          Classification                            Date Reported       

                          Comments                            Source                    

 

                    Pain in right knee                            Active                                

                          Problem                            2019                

                                                      Enayet Rahim                    

 

                    Pain in left knee                            Active                                 

                          Problem                            2019                 

                                                      Ensylviaet Laynem                    

 

                    Other chronic pain                            Active                                

                          Problem                            2019                

                                                      Encatherine Tillmanm                    

 

                    BMI 50.0-59.9, adult                            Active                              

                          Diagnosis                            2019            

                                                      Ensylviaet Laynem                    

 

                                        Migraine with aura and without status migrainosus, not intractable              

                    Active                                                        Problem    

                          2019                                                   

                                        Encatherine Tillmanm                    

 

                    Cardiomegaly                            Active                                      

                          Problem                            2019                      

                                                      Ensylviaet Rahim                    

 

                          BRODY (obstructive sleep apnea)                            Active                   

                                                Problem                            2019     

                                                      Ensylviaet Rahim                    

 

                          NAFLD (nonalcoholic fatty liver disease)                            Active        

                                                Problem                            2019

                                                        Encatherine Tillmanm                 

   

 

                          Gastroesophageal reflux disease without esophagitis                            Active

                                                        Problem                      

                    2019                                                        Aurelio Milian   

                 

 

                          Encounter to establish care                            Active                     

                                                Diagnosis                            2017     

                                                      Encatherine Tillmanm                    

 

                          Cervical cancer screening                            Active                       

                                                Diagnosis                            2017       

                                                      Aurelio Tillmanm                    

 

                          Breast cancer screening                            Active                         

                                                Diagnosis                            2017         

                                                      Aurelio Tillmanm                    

 

                    Left-sided chest pain                            Active                             

                           Diagnosis                            2019           

                                                      Ensylviaet Rahim                    

 

                    Essential hypertension                            Active                            

                            Problem                            2019            

                                                      Enayet Rahim                    

 

                    Metabolic syndrome                            Active                                

                          Problem                            2019                

                                                      Encatherine Tillmanm                    

 

                    Atypical pneumonia                            Active                                

                          Diagnosis                            2018              

                                                      Aurelio Tillmanm                    

 

                          Acute tracheobronchitis                            Active                         

                                                Diagnosis                            2018         

                                                      Aurelio Milian                    

 

                          Chest pain on breathing                            Active                         

                                                Diagnosis                            2019         

                                                      Aurelio Tillmanm                    

 

                          Right shoulder tendonitis                            Active                       

                                                Diagnosis                            2018       

                                                      Aurelio Milian                    

 

                    Tender lymph node                            Active                                 

                          Diagnosis                            10/06/2018               

                                                      Aurelio Milian                    

 

                          Acute non-recurrent streptococcal tonsillitis                            Active   

                                                      Diagnosis                       

                    10/06/2018                                                        Aurelio Tillmanm    

                

 

                    Daily headache                            Active                                    

                          Diagnosis                            2018                  

                                                      [facility]                    



                                                                                
                                                                                
                                                                                
                                                                                
                                                                                
   



Medications

                    





                    Medication                            Details                            Route      

                          Status                            Patient Instructions         

                          Ordering Provider                            Order Date           

                                        Source                    

 

                    Phentermine HCl                            1 tablet                            Orally

                            Active                            37.5 MG Orally Once a day

                            [facility]                            2019             

                                        [facility]                    

 

                    Telmisartan                            1 tablets                            Orally  

                          Active                            20 MG Orally Once a day  

                          [facility]                            2019               

                                        [facility]                    

 

                    Topamax                            bid                            Orally            

                          Active                            100 mg Orally daily                

                    [facility]                            2019                            [facility]                    

 

                    Telmisartan                            1 tablet                            Orally   

                          Active                            20 mg Orally Once a day   

                          [facility]                            2019                

                                        [facility]                    

 

                    Olmesartan Medoxomil                            1 tablet                            

Orally                            Active                            20 MG Orally Once

 a day                            [facility]                            2019       

                                        [facility]                    

 

                    Augmentin                            1 tablet                            Orally     

                          Active                            875-125 MG Orally every 12 hrs

                            [facility]                            10/01/2018             

                                        [facility]                    

 

                    Losartan Potassium                            1 tablet                            Orally

                            Active                            50 mg Orally Once a day

                            [facility]                            10/01/2018             

                                        [facility]                    

 

                    Topamax                            1 tablet                            Orally       

                          Active                            25 MG Orally daily            

                          [facility]                            2018                         

                                        [facility]                    

 

                    Diclofenac Sodium                            as directed                            

Transdermal                            Active                            1 % Transdermal

 bid prn                            [facility]                            2018     

                                        [facility]                    

 

                    Medrol                            as directed                            Orally     

                          Active                            4 MG Orally as directed     

                          [facility]                            2018                  

                                        [facility]                    

 

                    Levofloxacin                            1 tablet                            Orally  

                          Active                            750 MG Orally Once a day 

for 5 days                            [facility]                            2018   

                                        [facility]                    

 

                          MethylPREDNISolone                            as directed                         

                    Orally                            Active                            4 MG Orally As directed

                            [facility]                            2018             

                                        [facility]                    

 

                    Pantoprazole Sodium                            1 tablet                            Orally

                            Active                            40 mg Orally Once a day

                            [facility]                            2017             

                                        [facility]                    

 

                    Celebrex                            1 capsule with food                            Orally

                            Active                            100 mg Orally Once a day

                            [facility]                            2017             

                                        [facility]                    

 

                          Tylenol/Codeine #3                            1 tablet as needed                  

                    Orally                            Active                            300-30 MG

 Orally once every night as needed for pain                            [facility]      

                          2017                            [facility]             

       

 

                    Nexium                            1 capsule                            Orally       

                          Active                            40 MG Orally Once a day       

                     [facility]                                                        [facility]                    

 

                          Ibuprofen                            4 tablet with food or milk as needed         

                    Orally                            Active                            

200 MG Orally Once a day                            [facility]                         

                                                      [facility]                    

 

                          Albuterol Sulfate HFA                            2 puffs as needed                

                    Inhalation                            Active                            108

 (90 Base) MCG/ACT Inhalation every 4 hrs                            [facility]        

                                                      Aurelio Milian                    

 

                    Clobeta Cream                            not defined                            NA  

                          Active                                                     

                    [facility]                                                        Aurelio Milian          

          

 

                    ProAir HFA                            2 puffs as needed                            Inhalation

                            Active                            108 (90 Base) MCG/ACT Inhalation

 every 6 hrs                            [facility]                                       

                                        Aurelio Milian                    

 

                    Pantoprazole Sodium                            1 tablet                            Orally

                            Active                            40 mg Orally Once a day

                            [facility]                                                   

                                        [facility]                    

 

                    Dulera                            2 puffs                            Inhalation     

                          Active                            200-5 MCG/ACT Inhalation Twice

 a day                            [facility]                                             

                                        Aurelio Milian                    

 

                    Pantoprazole Sodium                            1 tablet                            Orally

                            Active                            40 mg Orally Once a day

                            [facility]                                                   

                                        Aurelio Milian                    

 

                    Topamax                            1 tablet                            Orally       

                          Active                            25 Orally daily               

                    [facility]                                                        Aurelio Milian

                    

 

                    Pantoprazole Sodium                            1 tablet                            Orally

                            Active                            40 Orally Once a day   

                         [facility]                                                        

Aurelio Milian                    



                                                                                
                                                                                
                                                                                
                                                                                
                                                                                
                                                               



Allergies, Adverse Reactions, Alerts

                    





                    Substance                            Category                            Reaction   

                          Severity                            Reaction type           

                          Status                            Date Reported                     

                          Comments                            Source                    

 

                    N.K.D.A.                            Adverse Reaction                            Info

 Not Available                                                        Adverse Reaction

                                                        2019                   

                                                      Enayet Rahim                    



                                                        



Immunizations

        





                                        No Data Provided for This Section



                                     



Results







                                        No Data Provided for This Section



                    



Pathology Reports







                                        No Data Provided for This Section                    



                            



Diagnostic Reports

            





                                        No Data Provided for This Section                    



                                                            



Consultation Notes

                    





                                        No Data Provided for This Section                    



                                                            



Discharge Summaries

                    





                                        No Data Provided for This Section                    



                                                            



History and Physicals

                    





                                        No Data Provided for This Section                    



                                                                



Vital Signs

                     





                    Vital Sign                            Value                            Date         

                          Comments                            Source                    

 

                    Weight                            311                             2019        

                                                      Enayet Rahim                    

 

                    Temperature Oral (F)                            98 F                            2019

                                                        Enayet Rahim                 

   

 

                    Diastolic (mm Hg)                            72                             2019

                                                        Enayet Rahim                 

   

 

                    Systolic (mm Hg)                            136                             2019

                                                        Enayet Rahim                 

   

 

                    Weight                            323                             2019        

                                                      Enayet Rahim                    

 

                    Temperature Oral (F)                            97.6 F                            2019

                                                        Enayet Rahim                 

   

 

                    Diastolic (mm Hg)                            74                             2019

                                                        Enayet Rahim                 

   

 

                    Systolic (mm Hg)                            129                             2019

                                                        Enayet Rahim                 

   

 

                    Weight                            329                             2019        

                                                      Enayet Rahim                    

 

                    Temperature Oral (F)                            97.8 F                            2019

                                                        Enayet Rahim                 

   

 

                    Diastolic (mm Hg)                            70                             2019

                                                        Enayet Rahim                 

   

 

                    Systolic (mm Hg)                            126                             2019

                                                        Enayet Rahim                 

   

 

                    Weight                            321                             10/01/2018        

                                                      Enayet Rahim                    

 

                    Temperature Oral (F)                            97.4 F                            10/01/2018

                                                        Enayet Rahim                 

   

 

                    Diastolic (mm Hg)                            85                             10/01/2018

                                                        Enayet Rahim                 

   

 

                    Systolic (mm Hg)                            158                             10/01/2018

                                                        Enayet Rahim                 

   

 

                    Weight                            340                             2018        

                                                      Enayet Rahim                    

 

                    Temperature Oral (F)                            98.3 F                            2018

                                                        Enayet Rahim                 

   

 

                    Diastolic (mm Hg)                            82                             2018

                                                        Enayet Rahim                 

   

 

                    Systolic (mm Hg)                            152                             2018

                                                        Enayet Rahim                 

   

 

                    Weight                            330                             2018        

                                                      Enayet Rahim                    

 

                    Temperature Oral (F)                            98.5 F                            2018

                                                        Enayet Rahim                 

   

 

                    Diastolic (mm Hg)                            79                             2018

                                                        Enayet Rahim                 

   

 

                    Systolic (mm Hg)                            131                             2018

                                                        Enayet Rahim                 

   

 

                    Weight                            328                             2018        

                                                      Enayet Rahim                    

 

                    Temperature Oral (F)                            98.1 F                            2018

                                                        Enayet Rahim                 

   

 

                    Diastolic (mm Hg)                            78                             2018

                                                        Enayet Rahim                 

   

 

                    Systolic (mm Hg)                            132                             2018

                                                        Enayet Rahim                 

   

 

                    Weight                            328                             2018        

                                                      Enayet Rahim                    

 

                    Temperature Oral (F)                            98.1 F                            2018

                                                        Enayet Rahim                 

   

 

                    Diastolic (mm Hg)                            78                             2018

                                                        Enayet Rahim                 

   

 

                    Systolic (mm Hg)                            132                             2018

                                                        Enayet Rahim                 

   

 

                    Weight                            325                             2018        

                                                      Enayet Rahim                    

 

                    Temperature Oral (F)                            96.9 F                            2018

                                                        Enayet Rahim                 

   

 

                    Diastolic (mm Hg)                            83                             2018

                                                        Enayet Rahim                 

   

 

                    Systolic (mm Hg)                            130                             2018

                                                        Enayet Rahim                 

   

 

                    Weight                            335                             2017        

                                                      Enayet Rahim                    

 

                    Temperature Oral (F)                            96.6 F                            2017

                                                        Enayet Rahim                 

   

 

                    Diastolic (mm Hg)                            88                             2017

                                                        Enayet Rahim                 

   

 

                    Systolic (mm Hg)                            153                             2017

                                                        Enayet Rahim                 

   



                                                                                
                                                                                
                                                                                
                                                                                
                                                                                
                                                                                
                                                                                
                                                                                
                                                                



Encounters

                    





                    Location                            Location Details                            Encounter

 Type                            Encounter Number                            Reason For

 Visit                            Attending Provider                            ADM Date

                            DC Date                            Status                

                                        Source                    

 

                                                                            Departed Emergency Room     

                          [id number]                                                  

                          NASREEN DAILEY MD                            2018                                                        [facility]                    



                                                                        



Procedures

        





                                        No Data Provided for This Section



                                                    



Assessment and Plan

                    





                                        No Data Provided for This Section                    



                                    



Plan of Care







                    Plan of Care        Date                Source

 

                                           Discharge Date 18 2:15am

 

  Disposition HOME, SELF-CARE

 

  Condition at Discharge Stable

 

  Instructions/Education Provided Sore Throat - Adult

 

  Forms Provided Work/School Excuse

 

  Prescriptions See Medication Section

 

  Additional Instructions/Education 1. Pls see your doctor on Monday for lab 
work in the setting of  

your new diet  

                                        2. As discussed a thorough physical exam done today shows no  

focal source of infection, this may change and if you have  

shortness of breath, chest pain, or any other concerning symptoms  

please return to the ED  

                                        3. Please see your PCP regardless on 2018                            [facility]                    



                                                                        



Social History

                    





                    Social History                            Date                            Source    

                

 

                                           Smoking Status Start Date Stop Date 

    Never Smoker

   

                              2018                            [facility]                    



                                                                    



Family History

                    





                                        No Data Provided for This Section                    



                                                                



Advance Directives

                    





                    Order Name                            Results                            Value      

                          Date                            Source                    

 

                          Advance Directives                            Advance Directives                  

                                           Directive Response Recorded Date/Time 

    Does the patient have an advance directive?

 No

 18 2:10am

 

  If yes, is advance directive on file with [facility]?

 No

 18 2:10am

 

  If not on file with [facility] will patient provide a copy?

 No

 18 2:10am

 

  Do you have a Directive to Physician?

 No

 18 2:10am

 

  Do you have a Medical Power of ?

 No

 18 2:10am

 

  Do you have an out of hospital Do Not Resuscitate Order?

 No

 18 2:10am

 

  Do you have any special needs we should be aware of?

 No

 18 2:10am

 

  Do you have a support person here with you today?

 Yes

 18 2:10am

 

  Did patient receive Notice of Privacy Practices?

 Yes

 18 2:10am

 

  Did patient receive patient rights and responsibilities?

 Yes

 18 2:10am

 

                              2018                            [facility]                    



                                                                    



Functional Status

                    





                                        No Data Provided for This Section

## 2019-09-20 NOTE — NUR
PATIENT BACK ON THE UNIT- PATIENT IN STABLE CONDITION WITH NO S/S OF RESPIRATORY DISTRESS. 
NO PAIN VOICED. TELEMETRY APPLIED. FAMILY MEMBERS PRESENT IN ROOM. CALL LIGHT IS WITHIN 
REACH, PATIENT INSTRUCTED TO CALL FOR ASSISTANCE AS NEEDED.

## 2019-09-20 NOTE — XMS REPORT
[facility]

                             Created on: 2019



Elena Turnre

External Reference #: [id number]

: 1960

Sex: Female



Demographics







                          Address                   [address] [place]  [address]

 

                          Home Phone                [phone]

 

                          Preferred Language        Unknown

 

                          Marital Status            Unknown

 

                          Orthodox Affiliation     Unknown

 

                          Race                      Unknown

 

                          Ethnic Group              UNK





Author







                          Author                    Tati Garces

 

                          Organization              [employer]

 

                          Address                   Unknown

 

                          Phone                     Unavailable







Care Team Providers







                    Care Team Member Name    Role                Phone

 

                    Tati Garces                        Unavailable



                                                                



Allergies, Adverse Reactions, Alerts

          





                    Substance           Reaction            Event Type

 

                    N.K.D.A.            Info Not Available    Non Drug Allergy



                                                                                
       



Problems

          





             Problem Type    Condition    Code         Onset Dates    Condition Status

 

             Problem      Other chronic pain    G89.29                    Active

 

             Problem      Pain in right knee    M25.561                   Active

 

             Problem      Pain in left knee    M25.562                   Active

 

             Problem      Essential hypertension    I10                       Active

 

             Problem      Cardiomegaly    I51.7                     Active

 

             Problem      Metabolic syndrome    E88.81                    Active

 

             Problem      Gastroesophageal reflux disease without esophagitis    K21.9                     Active

 

                    Problem             Migraine with aura and without status migrainosus, not intractable    G43.109

                                                    Active

 

             Problem      BRODY (obstructive sleep apnea)    G47.33                    Active

 

             Problem      NAFLD (nonalcoholic fatty liver disease)    K76.0                     Active

 

             Assessment    BMI 50.0-59.9, adult    Z68.43                    Active

 

             Assessment    Chest pain on breathing    R07.1                     Active

 

             Assessment    NAFLD (nonalcoholic fatty liver disease)    K76.0                     Active

 

             Assessment    Essential hypertension    I10                       Active

 

             Problem      BMI 50.0-59.9, adult    Z68.43                    Active



                                                                                
                                                                                
                                                                  



Medications

          





        Medication    Code System    Code    Instructions    Start Date    End Date    Status    Dosage



 

           ProAir HFA    NDC        72191004067    108 (90 Base) MCG/ACT Inhalation every 6 hrs                

                          Active                    2 puffs as needed

 

          Phentermine HCl    NDC       12591066658    37.5 MG Orally Once a day    2019              

Active                                  1 tablet

 

          Telmisartan    NDC       07061337756    20 MG Orally Once a day    2019              Active

                                        1 tablets

 

        Pantoprazole Sodium    NDC     [address]-10    40 mg Orally Once a day                    Active    

1 tablet

 

        Topamax    NDC     52893420882    100 mg Orally daily    2019            Active    bid



                                                                                
                                                         



Vital Signs

          





                          Date/Time:                2019

 

                          BMI                       57.21 Index

 

                          Weight                    323 lbs

 

                          Height                    63" in

 

                          Temperature               97.6 F

 

                          Blood Pressure Diastolic    74 mm Hg

 

                          Blood Pressure Systolic    129 mm Hg



                                                                              



Results

          No Known Results                                                      
             



Summary Purpose

          [employer] Submission

## 2019-09-20 NOTE — XMS REPORT
Continuity of Care Document

                             Created on: 2019



TAYLOR HENDERSON

External Reference #: [id number]

: 1960

Sex: Female



Demographics







                          Address                   [address] [place]  [address]

 

                          Home Phone                [phone]

 

                          Preferred Language        English

 

                          Marital Status            Unknown

 

                          Quaker Affiliation     Unknown

 

                          Race                      Unknown

 

                          Ethnic Group              Unknown





Author







                          Author                    [employer]

 

                          Organization              [employer]

 

                          Address                   Unknown

 

                          Phone                     Unavailable







Care Team Providers







                    Care Team Member Name    Role                Phone

 

                    [employer]    Unavailable         Unavailable



                                    



Problems

                    





                    Problem                            Status                            Onset Date     

                          Classification                            Date Reported       

                          Comments                            Source                    

 

                    Pain in right knee                            Active                                

                          Problem                            2019                

                                                      Enayet Rahim                    

 

                    Pain in left knee                            Active                                 

                          Problem                            2019                 

                                                      Ensylviaet Laynem                    

 

                    Other chronic pain                            Active                                

                          Problem                            2019                

                                                      Encatherine Tillmanm                    

 

                    BMI 50.0-59.9, adult                            Active                              

                          Diagnosis                            2019            

                                                      Ensylviaet Laynem                    

 

                                        Migraine with aura and without status migrainosus, not intractable              

                    Active                                                        Problem    

                          2019                                                   

                                        Encatherine Tillmanm                    

 

                    Cardiomegaly                            Active                                      

                          Problem                            2019                      

                                                      Ensylviaet Rahim                    

 

                          BRODY (obstructive sleep apnea)                            Active                   

                                                Problem                            2019     

                                                      Ensylviaet Rahim                    

 

                          NAFLD (nonalcoholic fatty liver disease)                            Active        

                                                Problem                            2019

                                                        Encatherine Tillmanm                 

   

 

                          Gastroesophageal reflux disease without esophagitis                            Active

                                                        Problem                      

                    2019                                                        Aurelio Milian   

                 

 

                          Encounter to establish care                            Active                     

                                                Diagnosis                            2017     

                                                      Encatherine Tillmanm                    

 

                          Cervical cancer screening                            Active                       

                                                Diagnosis                            2017       

                                                      Aurelio Tillmanm                    

 

                          Breast cancer screening                            Active                         

                                                Diagnosis                            2017         

                                                      Aurelio Tillmanm                    

 

                    Left-sided chest pain                            Active                             

                           Diagnosis                            2019           

                                                      Ensylviaet Rahim                    

 

                    Essential hypertension                            Active                            

                            Problem                            2019            

                                                      Enayet Rahim                    

 

                    Metabolic syndrome                            Active                                

                          Problem                            2019                

                                                      Encatherine Tillmanm                    

 

                    Atypical pneumonia                            Active                                

                          Diagnosis                            2018              

                                                      Aurelio Tillmanm                    

 

                          Acute tracheobronchitis                            Active                         

                                                Diagnosis                            2018         

                                                      Aurelio Milian                    

 

                          Chest pain on breathing                            Active                         

                                                Diagnosis                            2019         

                                                      Aurelio Tillmanm                    

 

                          Right shoulder tendonitis                            Active                       

                                                Diagnosis                            2018       

                                                      Aurelio Milian                    

 

                    Tender lymph node                            Active                                 

                          Diagnosis                            10/06/2018               

                                                      Aurelio Milian                    

 

                          Acute non-recurrent streptococcal tonsillitis                            Active   

                                                      Diagnosis                       

                    10/06/2018                                                        Aurelio Tillmanm    

                

 

                    Daily headache                            Active                                    

                          [facility]                            2018                  

                                                      [facility]                    



                                                                                
                                                                                
                                                                                
                                                                                
                                                                                
   



Medications

                    





                    Medication                            Details                            Route      

                          Status                            Patient Instructions         

                          Ordering Provider                            Order Date           

                                        Source                    

 

                    Phentermine HCl                            1 tablet                            Orally

                            Active                            37.5 MG Orally Once a day

                            [facility]                            2019             

                                        [facility]                    

 

                    Telmisartan                            1 tablets                            Orally  

                          Active                            20 MG Orally Once a day  

                          [facility]                            2019               

                                        [facility]                    

 

                    Topamax                            bid                            Orally            

                          Active                            100 mg Orally daily                

                    [facility]                            2019                            [facility]                    

 

                    Telmisartan                            1 tablet                            Orally   

                          Active                            20 mg Orally Once a day   

                          [facility]                            2019                

                                        [facility]                    

 

                    Olmesartan Medoxomil                            1 tablet                            

Orally                            Active                            20 MG Orally Once

 a day                            [facility]                            2019       

                                        [facility]                    

 

                    Augmentin                            1 tablet                            Orally     

                          Active                            875-125 MG Orally every 12 hrs

                            [facility]                            10/01/2018             

                                        [facility]                    

 

                    Losartan Potassium                            1 tablet                            Orally

                            Active                            50 mg Orally Once a day

                            [facility]                            10/01/2018             

                                        [facility]                    

 

                    Topamax                            1 tablet                            Orally       

                          Active                            25 MG Orally daily            

                          [facility]                            2018                         

                                        [facility]                    

 

                    Diclofenac Sodium                            as directed                            

Transdermal                            Active                            1 % Transdermal

 bid prn                            [facility]                            2018     

                                        [facility]                    

 

                    Medrol                            as directed                            Orally     

                          Active                            4 MG Orally as directed     

                          [facility]                            2018                  

                                        [facility]                    

 

                    Levofloxacin                            1 tablet                            Orally  

                          Active                            750 MG Orally Once a day 

for 5 days                            [facility]                            2018   

                                        [facility]                    

 

                          MethylPREDNISolone                            as directed                         

                    Orally                            Active                            4 MG Orally As directed

                            [facility]                            2018             

                                        [facility]                    

 

                    Pantoprazole Sodium                            1 tablet                            Orally

                            Active                            40 mg Orally Once a day

                            [facility]                            2017             

                                        [facility]                    

 

                    Celebrex                            1 capsule with food                            Orally

                            Active                            100 mg Orally Once a day

                            [facility]                            2017             

                                        [facility]                    

 

                          Tylenol/Codeine #3                            1 tablet as needed                  

                    Orally                            Active                            300-30 MG

 Orally once every night as needed for pain                            [facility]      

                          2017                            [facility]             

       

 

                    Nexium                            1 capsule                            Orally       

                          Active                            40 MG Orally Once a day       

                     [facility]                                                        [facility]                    

 

                          Ibuprofen                            4 tablet with food or milk as needed         

                    Orally                            Active                            

200 MG Orally Once a day                            [facility]                         

                                                      [facility]                    

 

                          Albuterol Sulfate HFA                            2 puffs as needed                

                    Inhalation                            Active                            108

 (90 Base) MCG/ACT Inhalation every 4 hrs                            [facility]        

                                                      Aurelio Milian                    

 

                    Clobeta Cream                            not defined                            NA  

                          Active                                                     

                    [facility]                                                        Aurelio Milian          

          

 

                    ProAir HFA                            2 puffs as needed                            Inhalation

                            Active                            108 (90 Base) MCG/ACT Inhalation

 every 6 hrs                            [facility]                                       

                                        Aurelio Milian                    

 

                    Pantoprazole Sodium                            1 tablet                            Orally

                            Active                            40 mg Orally Once a day

                            [facility]                                                   

                                        Aurelio Milian                    

 

                    Dulera                            2 puffs                            Inhalation     

                          Active                            200-5 MCG/ACT Inhalation Twice

 a day                            [facility]                                             

                                        Aurelio Milian                    

 

                    Pantoprazole Sodium                            1 tablet                            Orally

                            Active                            40 mg Orally Once a day

                            [facility]                                                   

                                        Aurelio Milian                    

 

                    Topamax                            1 tablet                            Orally       

                          Active                            25 Orally daily               

                    [facility]                                                        Aurelio Milian

                    

 

                    Pantoprazole Sodium                            1 tablet                            Orally

                            Active                            40 Orally Once a day   

                         [facility]                                                        

Aurelio Milian                    



                                                                                
                                                                                
                                                                                
                                                                                
                                                                                
                                                               



Allergies, Adverse Reactions, Alerts

                    





                    Substance                            Category                            Reaction   

                          Severity                            Reaction type           

                          Status                            Date Reported                     

                          Comments                            Source                    

 

                    N.K.D.A.                            Adverse Reaction                            Info

 Not Available                                                        Adverse Reaction

                                                        2019                   

                                                      Enayet Rahim                    



                                                        



Immunizations

        





                                        No Data Provided for This Section



                                     



Results







                                        No Data Provided for This Section



                    



Pathology Reports







                                        No Data Provided for This Section                    



                            



Diagnostic Reports

            





                                        No Data Provided for This Section                    



                                                            



Consultation Notes

                    





                                        No Data Provided for This Section                    



                                                            



Discharge Summaries

                    





                                        No Data Provided for This Section                    



                                                            



History and Physicals

                    





                                        No Data Provided for This Section                    



                                                                



Vital Signs

                     





                    Vital Sign                            Value                            Date         

                          Comments                            Source                    

 

                    Weight                            311                             2019        

                                                      Enayet Rahim                    

 

                    Temperature Oral (F)                            98 F                            2019

                                                        Enayet Rahim                 

   

 

                    Diastolic (mm Hg)                            72                             2019

                                                        Enayet Rahim                 

   

 

                    Systolic (mm Hg)                            136                             2019

                                                        Enayet Rahim                 

   

 

                    Weight                            323                             2019        

                                                      Enayet Rahim                    

 

                    Temperature Oral (F)                            97.6 F                            2019

                                                        Enayet Rahim                 

   

 

                    Diastolic (mm Hg)                            74                             2019

                                                        Enayet Rahim                 

   

 

                    Systolic (mm Hg)                            129                             2019

                                                        Enayet Rahim                 

   

 

                    Weight                            329                             2019        

                                                      Enayet Rahim                    

 

                    Temperature Oral (F)                            97.8 F                            2019

                                                        Enayet Rahim                 

   

 

                    Diastolic (mm Hg)                            70                             2019

                                                        Enayet Rahim                 

   

 

                    Systolic (mm Hg)                            126                             2019

                                                        Enayet Rahim                 

   

 

                    Weight                            321                             10/01/2018        

                                                      Enayet Rahim                    

 

                    Temperature Oral (F)                            97.4 F                            10/01/2018

                                                        Enayet Rahim                 

   

 

                    Diastolic (mm Hg)                            85                             10/01/2018

                                                        Enayet Rahim                 

   

 

                    Systolic (mm Hg)                            158                             10/01/2018

                                                        Enayet Rahim                 

   

 

                    Weight                            340                             2018        

                                                      Enayet Rahim                    

 

                    Temperature Oral (F)                            98.3 F                            2018

                                                        Enayet Rahim                 

   

 

                    Diastolic (mm Hg)                            82                             2018

                                                        Enayet Rahim                 

   

 

                    Systolic (mm Hg)                            152                             2018

                                                        Enayet Rahim                 

   

 

                    Weight                            330                             2018        

                                                      Enayet Rahim                    

 

                    Temperature Oral (F)                            98.5 F                            2018

                                                        Enayet Rahim                 

   

 

                    Diastolic (mm Hg)                            79                             2018

                                                        Enayet Rahim                 

   

 

                    Systolic (mm Hg)                            131                             2018

                                                        Enayet Rahim                 

   

 

                    Weight                            328                             2018        

                                                      Enayet Rahim                    

 

                    Temperature Oral (F)                            98.1 F                            2018

                                                        Enayet Rahim                 

   

 

                    Diastolic (mm Hg)                            78                             2018

                                                        Enayet Rahim                 

   

 

                    Systolic (mm Hg)                            132                             2018

                                                        Enayet Rahim                 

   

 

                    Weight                            328                             2018        

                                                      Enayet Rahim                    

 

                    Temperature Oral (F)                            98.1 F                            2018

                                                        Enayet Rahim                 

   

 

                    Diastolic (mm Hg)                            78                             2018

                                                        Enayet Rahim                 

   

 

                    Systolic (mm Hg)                            132                             2018

                                                        Enayet Rahim                 

   

 

                    Weight                            325                             2018        

                                                      Enayet Rahim                    

 

                    Temperature Oral (F)                            96.9 F                            2018

                                                        Enayet Rahim                 

   

 

                    Diastolic (mm Hg)                            83                             2018

                                                        Enayet Rahim                 

   

 

                    Systolic (mm Hg)                            130                             2018

                                                        Enayet Rahim                 

   

 

                    Weight                            335                             2017        

                                                      Enayet Rahim                    

 

                    Temperature Oral (F)                            96.6 F                            2017

                                                        Enayet Rahim                 

   

 

                    Diastolic (mm Hg)                            88                             2017

                                                        Enayet Rahim                 

   

 

                    Systolic (mm Hg)                            153                             2017

                                                        Enayet Rahim                 

   



                                                                                
                                                                                
                                                                                
                                                                                
                                                                                
                                                                                
                                                                                
                                                                                
                                                                



Encounters

                    





                    Location                            Location Details                            Encounter

 Type                            Encounter Number                            Reason For

 Visit                            Attending Provider                            ADM Date

                            DC Date                            Status                

                                        Source                    

 

                                                                            Departed Emergency Room     

                          [id number]                                                  

                          NASREEN DAILEY MD                            2018                                                        [facility]                    



                                                                        



Procedures

        





                                        No Data Provided for This Section



                                                    



Assessment and Plan

                    





                                        No Data Provided for This Section                    



                                    



Plan of Care







                    Plan of Care        Date                Source

 

                                           Discharge Date 18 2:15am

 

  Disposition HOME, SELF-CARE

 

  Condition at Discharge Stable

 

  Instructions/Education Provided Sore Throat - Adult

 

  Forms Provided Work/School Excuse

 

  Prescriptions See Medication Section

 

  Additional Instructions/Education 1. Pls see your doctor on Monday for lab 
work in the setting of  

your new diet  

                                        2. As discussed a thorough physical exam done today shows no  

focal source of infection, this may change and if you have  

shortness of breath, chest pain, or any other concerning symptoms  

please return to the ED  

                                        3. Please see your PCP regardless on 2018                            [facility]                    



                                                                        



Social History

                    





                    Social History                            Date                            Source    

                

 

                                           Smoking Status Start Date Stop Date 

    Never Smoker

   

                              2018                            [facility]                    



                                                                    



Family History

                    





                                        No Data Provided for This Section                    



                                                                



Advance Directives

                    





                    Order Name                            Results                            Value      

                          Date                            Source                    

 

                          Advance Directives                            Advance Directives                  

                                           Directive Response Recorded Date/Time 

    Does the patient have an advance directive?

 No

 18 2:10am

 

  If yes, is advance directive on file with [facility]?

 No

 18 2:10am

 

  If not on file with [facility] will patient provide a copy?

 No

 18 2:10am

 

  Do you have a Directive to Physician?

 No

 18 2:10am

 

  Do you have a Medical Power of ?

 No

 18 2:10am

 

  Do you have an out of hospital Do Not Resuscitate Order?

 No

 18 2:10am

 

  Do you have any special needs we should be aware of?

 No

 18 2:10am

 

  Do you have a support person here with you today?

 Yes

 18 2:10am

 

  Did patient receive Notice of Privacy Practices?

 Yes

 18 2:10am

 

  Did patient receive patient rights and responsibilities?

 Yes

 18 2:10am

 

                              2018                            [facility]                    



                                                                    



Functional Status

                    





                                        No Data Provided for This Section

## 2019-09-20 NOTE — XMS REPORT
Inpatient Providers [facility]

                             Created on: 2018



Elena Truner

External Reference #: [id number]

: 1960

Sex: Female



Demographics







                          Address                   [address] [place]  [address]

 

                          Home Phone                [phone]

 

                          Preferred Language        Unknown

 

                          Marital Status            Unknown

 

                          Congregation Affiliation     Unknown

 

                          Race                      Unknown

 

                          Ethnic Group              /Latin





Author







                          Author                    Tati Garces

 

                          Organization              [employer]

 

                          Address                   Unknown

 

                          Phone                     Unavailable







Care Team Providers







                    Care Team Member Name    Role                Phone

 

                    Tati Garces      CP                  Unavailable



                                                                



Allergies

          No Known Allergies                                                    
                                   



Problems

          





             Problem Type    Condition    Code         Onset Dates    Condition Status

 

             Problem      Other chronic pain    G89.29                    Active

 

             Problem      Pain in left knee    M25.562                   Active

 

             Problem      BMI 50.0-59.9, adult    Z68.43                    Active

 

             Problem      Cardiomegaly    I51.7                     Active

 

             Problem      BRODY (obstructive sleep apnea)    G47.33                    Active

 

             Problem      Essential hypertension    I10                       Active

 

                    Problem             Migraine with aura and without status migrainosus, not intractable    G43.109

                                                    Active

 

             Problem      Pain in right knee    M25.561                   Active

 

             Problem      NAFLD (nonalcoholic fatty liver disease)    K76.0                     Active

 

             Problem      Gastroesophageal reflux disease without esophagitis    K21.9                     Active



                                                                                
                                                                                
                



Medications

          





        Medication    Code System    Code    Instructions    Start Date    End Date    Status    Dosage



 

        Pantoprazole Sodium    NDC     09401432751    40 Orally Once a day                    Active    1 tablet





                                                                              



Results

          No Known Results                                                      
             



Summary Purpose

          [employer] Submission

## 2019-09-20 NOTE — NUR
PATIENT IS AWAKE AND IN STABLE CONDITION WITH NO S/S OF RESPIRATORY DISTRESS. PATIENT C/O 
CHEST PAIN 4/10 BUT IS REFUSING PAIN MEDICATION. TELEMETRY APPLIED. FAMILY MEMBER PRESENT IN 
ROOM. CALL LIGHT IS WITHIN REACH, PATIENT INSTRUCTED TO CALL FOR ASSISTANCE AS NEEDED.

## 2019-09-20 NOTE — NUR
PATIENT OFF THE UNIT AT 1320 PER WHEELCHAIR TO NUCLEAR MEDICINE. PATIENT IS IN STABLE 
CONDITION WITH NO S/S OF RESPIRATORY DISTRESS. NO PAIN VOICED. TELEMETRY APPLIED.

## 2019-09-20 NOTE — XMS REPORT
Inpatient [facility]

                             Created on: 2019



Elena Turner

External Reference #: [id number]

: 1960

Sex: Female



Demographics







                          Address                   [address] [place]  [address]

 

                          Home Phone                [phone]

 

                          Preferred Language        Unknown

 

                          Marital Status            Unknown

 

                          Confucianism Affiliation     Unknown

 

                          Race                      Unknown

 

                          Ethnic Group              UNK





Author







                          Author                    Tait Garces

 

                          Organization              [employer]

 

                          Address                   Unknown

 

                          Phone                     Unavailable







Care Team Providers







                    Care Team Member Name    Role                Phone

 

                    Tati Garces                        Unavailable



                                                                



Allergies, Adverse Reactions, Alerts

          





                    Substance           Reaction            Event Type

 

                    N.K.D.A.            Info Not Available    Non Drug Allergy



                                                                                
       



Problems

          





             Problem Type    Condition    Code         Onset Dates    Condition Status

 

             Problem      Other chronic pain    G89.29                    Active

 

             Problem      Pain in right knee    M25.561                   Active

 

             Problem      Pain in left knee    M25.562                   Active

 

             Problem      Essential hypertension    I10                       Active

 

             Problem      Cardiomegaly    I51.7                     Active

 

             Problem      Metabolic syndrome    E88.81                    Active

 

             Problem      Gastroesophageal reflux disease without esophagitis    K21.9                     Active

 

                    Problem             Migraine with aura and without status migrainosus, not intractable    G43.109

                                                    Active

 

             Problem      BRODY (obstructive sleep apnea)    G47.33                    Active

 

             Problem      NAFLD (nonalcoholic fatty liver disease)    K76.0                     Active

 

             Assessment    Gastroesophageal reflux disease without esophagitis    K21.9                     Active



 

                    Assessment          Migraine with aura and without status migrainosus, not intractable    

G43.109                                             Active

 

             Assessment    Essential hypertension    I10                       Active

 

             Assessment    BMI 50.0-59.9, adult    Z68.43                    Active

 

             Problem      BMI 50.0-59.9, adult    Z68.43                    Active



                                                                                
                                                                                
                                                                  



Medications

          





        Medication    Code System    Code    Instructions    Start Date    End Date    Status    Dosage



 

          Telmisartan    NDC       35788403767    20 MG Orally Once a day    2019              Active

                                        1 tablets

 

        Topamax    NDC     70210203741    100 mg Orally daily    2019            Active    bid

 

        Pantoprazole Sodium    NDC     [address]-10    40 mg Orally Once a day                    Active    

1 tablet

 

          Phentermine HCl    NDC       26094088157    37.5 MG Orally Once a day    2019              

Active                                  1 tablet

 

        Topamax    NDC     65248430848    25 Orally daily                    Inactive    1 tablet

 

           ProAir HFA    NDC        21047093583    108 (90 Base) MCG/ACT Inhalation every 6 hrs                

                          Active                    2 puffs as needed



                                                                                
                                                                   



Vital Signs

          





                          Date/Time:                2019

 

                          BMI                       55.09 Index

 

                          Weight                    311 lbs

 

                          Height                    63" in

 

                          Temperature               98 F

 

                          Blood Pressure Diastolic    72 mm Hg

 

                          Blood Pressure Systolic    136 mm Hg



                                                                              



Results

          No Known Results                                                      
             



Summary Purpose

          [employer] Submission

## 2019-09-21 VITALS — DIASTOLIC BLOOD PRESSURE: 56 MMHG | SYSTOLIC BLOOD PRESSURE: 104 MMHG

## 2019-09-21 VITALS — SYSTOLIC BLOOD PRESSURE: 104 MMHG | DIASTOLIC BLOOD PRESSURE: 56 MMHG

## 2019-09-21 VITALS — SYSTOLIC BLOOD PRESSURE: 107 MMHG | DIASTOLIC BLOOD PRESSURE: 53 MMHG

## 2019-09-21 VITALS — DIASTOLIC BLOOD PRESSURE: 54 MMHG | SYSTOLIC BLOOD PRESSURE: 103 MMHG

## 2019-09-21 LAB
CHOLEST SERPL-MCNC: 137 MD/DL (ref 0–199)
CHOLEST/HDLC SERPL: 4 {RATIO} (ref 3–3.6)
HDLC SERPL-MSCNC: 34 MG/DL (ref 40–60)
LDLC SERPL CALC-MCNC: 85 MG/DL (ref 60–130)
TRIGL SERPL-MCNC: 92 MG/DL (ref 0–149)

## 2019-09-21 RX ADMIN — Medication SCH MG: at 08:23

## 2019-09-21 RX ADMIN — TOPIRAMATE SCH MG: 25 TABLET, COATED ORAL at 08:23

## 2019-09-21 RX ADMIN — NITROGLYCERIN SCH GM: 20 OINTMENT TOPICAL at 05:47

## 2019-09-21 RX ADMIN — NITROGLYCERIN SCH GM: 20 OINTMENT TOPICAL at 00:52

## 2019-09-21 RX ADMIN — FAMOTIDINE SCH MG: 20 TABLET, FILM COATED ORAL at 08:23

## 2019-09-21 RX ADMIN — PANTOPRAZOLE SODIUM SCH MG: 40 TABLET, DELAYED RELEASE ORAL at 08:23

## 2019-09-21 NOTE — NUR
PATIENT DISCHARGE HOME- PATIENT OFF THE UNIT AT 1021 PER WHEELCHAIR ACCOMPANIED BY STAFF 
MEMBER TO THE FRONT LOBBY. PATIENT IS IN STABLE CONDITION WITH NO S/S OF RESPIRATORY 
DISTRESS. NO PAIN VOICED. IV REMOVED WITH TIP INTACT. DISCHARGE TEACHING AND INSTRUCTIONS 
GIVEN TO THE PATIENT. ALL PERSONAL ITEMS WERE TAKEN BY THE PATIENT.

## 2019-09-21 NOTE — CONSULTATION
DATE OF CONSULTATION:  09/20/2019

 

Cardiology Consultation 

 

REQUESTING PHYSICIAN:  Dr. Ingram.

 

REASON FOR CONSULTATION:  Chest pain.

 

HISTORY OF PRESENT ILLNESS:  This is a 59-year-old woman with history of hypertension

and morbid obesity, who presents with complaints of chest pain.  The patient reports she

developed chest tightness yesterday that lasted all day, 10/10 in severity.  This was

not associated with any shortness of breath, nausea, or diaphoresis.  The pain did not

radiate.  She denies any edema orthopnea or PND. 

 

REVIEW OF SYSTEMS:

Negative except as per HPI.

 

PAST MEDICAL HISTORY:  Hypertension.

 

PAST SURGICAL HISTORY:  

1. Hysterectomy.

2. Carpal tunnel surgery.

 

ALLERGIES:  PLEASE SEE EMR.

 

MEDICATIONS:  Please see medication list.

 

SOCIAL HISTORY:  Denies tobacco, alcohol, or illicit drugs.

 

FAMILY HISTORY:  Pertinent for brother who with report of heart disease.

 

PHYSICAL EXAMINATION:

VITAL SIGNS:  96.2 degrees, pulse 55, respiratory rate 17, blood pressure 122/58, and

oxygen saturation 97% on room air. 

GENERAL:  Awake and alert, in no acute distress.  Morbidly obese. 

HEENT:  Normocephalic and atraumatic.  Pupils are equal.  No scleral icterus. 

NECK:  Supple.  No thyromegaly or cervical lymphadenopathy.  No carotid bruits. 

LUNGS:  Clear to auscultation bilaterally.  No wheezes or crackles. 

CARDIOVASCULAR:  Normal rate regular rhythm.  No murmur.  Normal S1 and S2. 

ABDOMEN:  Soft and nontender. 

EXTREMITIES:  No edema. 

NEUROLOGIC:  Nonfocal.

LABORATORY DATA:  Troponin is 0.007.  EKG, normal sinus rhythm.  Low-voltage QRS. 

 

Chest x-ray, no acute cardiopulmonary abnormalities.

 

IMPRESSION:  

1. Chest pain.

2. Hypertension.

3. Morbid obesity.

 

RECOMMENDATIONS:  No evidence of myocardial infarction on serial cardiac biomarkers.

Nuclear stress test performed without evidence of ischemia.  Check fasting lipid panel.

Blood pressure is controlled.  Continue current cardiac medications.  No further cardiac

evaluation is indicated at this time.  Obtain echocardiogram. 

 

Thank you for this consult.  We will continue to follow.

 

 

 

 

______________________________

Lisa Zamora MD

 

ABS/MODL

D:  09/20/2019 19:48:30

T:  09/21/2019 03:48:24

Job #:  [id number]

## 2019-09-21 NOTE — NUR
PATIENT IS IN STABLE CONDITION WITH NO S/S OF RESPIRATORY DISTRESS. PATIENT DENIES CHEST 
PAIN. TELEMETRY APPLIED. FAMILY MEMBERS PRESENT IN ROOM. CALL LIGHT IS WITHIN REACH, PATIENT 
INSTRUCTED TO CALL FOR ASSISTANCE AS NEEDED.

## 2019-09-22 NOTE — DISCHARGE SUMMARY
DISCHARGE DIAGNOSIS:  Chest pain, rule out myocardial infarction.

 

HISTORY OF PRESENT ILLNESS:  The patient is a lady with a history of asthma,

hypertension, morbid obesity, who presented with some vague and atypical chest pain that

was midsternal in nature, occurring without exertion, so she was brought in for chest

pain to rule out MI protocol.  Her cardiac enzymes were negative.  EKG was negative.

She was seen by Cardiology, who did a stress test and was unremarkable and that she also

had an echo done that showed an EF of 45-50%.  After admission, patient had no further

chest pains, so she really wanted to go home, follow up with her primary care physician

as an outpatient.  So, the patient was discharged with her continuation of her home

medication and follow up with her PCP in 1 to 2 weeks.  Please see hospital chart for

full details. 

 

 

 

 

______________________________

MD DONNY Macias/ABDIRAHMAN

D:  09/21/2019 07:06:25

T:  09/22/2019 02:28:40

Job #:  [id number]

## 2021-08-05 ENCOUNTER — HOSPITAL ENCOUNTER (EMERGENCY)
Dept: HOSPITAL 88 - FSED | Age: 61
LOS: 1 days | Discharge: HOME | End: 2021-08-06
Payer: COMMERCIAL

## 2021-08-05 VITALS — HEIGHT: 63 IN | BODY MASS INDEX: 51.91 KG/M2 | WEIGHT: 293 LBS

## 2021-08-05 DIAGNOSIS — E66.01: ICD-10-CM

## 2021-08-05 DIAGNOSIS — M17.0: ICD-10-CM

## 2021-08-05 DIAGNOSIS — I10: ICD-10-CM

## 2021-08-05 DIAGNOSIS — S83.91XA: Primary | ICD-10-CM

## 2021-08-05 PROCEDURE — 96372 THER/PROPH/DIAG INJ SC/IM: CPT

## 2021-08-05 PROCEDURE — 99283 EMERGENCY DEPT VISIT LOW MDM: CPT

## 2022-01-30 ENCOUNTER — HOSPITAL ENCOUNTER (EMERGENCY)
Dept: HOSPITAL 88 - FSED | Age: 62
Discharge: HOME | End: 2022-01-30
Payer: COMMERCIAL

## 2022-01-30 VITALS — HEIGHT: 63 IN | BODY MASS INDEX: 51.91 KG/M2 | WEIGHT: 293 LBS

## 2022-01-30 VITALS — DIASTOLIC BLOOD PRESSURE: 68 MMHG | SYSTOLIC BLOOD PRESSURE: 129 MMHG

## 2022-01-30 DIAGNOSIS — I10: ICD-10-CM

## 2022-01-30 DIAGNOSIS — M17.0: ICD-10-CM

## 2022-01-30 DIAGNOSIS — N39.0: ICD-10-CM

## 2022-01-30 DIAGNOSIS — E66.01: ICD-10-CM

## 2022-01-30 DIAGNOSIS — E11.9: ICD-10-CM

## 2022-01-30 DIAGNOSIS — K21.9: ICD-10-CM

## 2022-01-30 DIAGNOSIS — R10.11: Primary | ICD-10-CM

## 2022-01-30 DIAGNOSIS — K76.0: ICD-10-CM

## 2022-01-30 PROCEDURE — 99284 EMERGENCY DEPT VISIT MOD MDM: CPT

## 2022-01-30 PROCEDURE — 74177 CT ABD & PELVIS W/CONTRAST: CPT
